# Patient Record
Sex: FEMALE | Race: WHITE | NOT HISPANIC OR LATINO | Employment: FULL TIME | URBAN - METROPOLITAN AREA
[De-identification: names, ages, dates, MRNs, and addresses within clinical notes are randomized per-mention and may not be internally consistent; named-entity substitution may affect disease eponyms.]

---

## 2017-02-06 ENCOUNTER — GYNECOLOGY VISIT (OUTPATIENT)
Dept: OBGYN | Facility: CLINIC | Age: 32
End: 2017-02-06
Payer: COMMERCIAL

## 2017-02-06 VITALS
HEIGHT: 63 IN | SYSTOLIC BLOOD PRESSURE: 102 MMHG | BODY MASS INDEX: 27.18 KG/M2 | DIASTOLIC BLOOD PRESSURE: 66 MMHG | WEIGHT: 153.4 LBS

## 2017-02-06 DIAGNOSIS — Z30.431 IUD CHECK UP: ICD-10-CM

## 2017-02-06 PROCEDURE — 99213 OFFICE O/P EST LOW 20 MIN: CPT | Performed by: OBSTETRICS & GYNECOLOGY

## 2017-02-06 RX ORDER — IBUPROFEN 800 MG/1
800 TABLET ORAL EVERY 8 HOURS PRN
Qty: 30 TAB | Refills: 4 | Status: SHIPPED | OUTPATIENT
Start: 2017-02-06

## 2017-02-06 NOTE — PROGRESS NOTES
"31-year-old  who is here today for follow-up of IUD placement. Patient had ParaGard placed . She states she is doing well and desires to retain the device. She states she's had 2 menstrual cycles are slightly heavier and slightly more painful than previous but otherwise she states she is doing well. She has no abdominal pain pelvic pain, no issues related to intercourse.    /66 mmHg  Ht 1.6 m (5' 3\")  Wt 69.582 kg (153 lb 6.4 oz)  BMI 27.18 kg/m2  LMP 2014  Breastfeeding? No  Abdomen is nontender and soft  Normal external female genitalia  Cervix is normal with the IUD strings clearly noted at the cervical os  The uterus is nontender  Adnexa bilaterally nontender    Status post IUD placement doing well  Motrin 800 mg every 8 hours during menstrual cycle for pain and menorrhagia  Follow-up as needed or in one year for annual gynecologic exam  "

## 2017-02-06 NOTE — MR AVS SNAPSHOT
"        Wendy Garcia   2017 9:00 AM   Gynecology Visit   MRN: [id number]    Department:  [facility]   Dept Phone:  [phone]    Description:  Female : 1985   Provider:  Diana Keith M.D.           Reason for Visit     Follow-Up           Allergies as of 2017     Allergen Noted Reactions    Amoxicillin 2014   Rash      You were diagnosed with     IUD check up   [224514]         Vital Signs     Blood Pressure Height Weight Body Mass Index Last Menstrual Period Breastfeeding?    102/66 mmHg 1.6 m (5' 3\") 69.582 kg (153 lb 6.4 oz) 27.18 kg/m2 2014 No    Smoking Status                   Never Smoker            Basic Information     Date Of Birth Sex Race Ethnicity Preferred Language    1985 Female White Non- English      Problem List              ICD-10-CM Priority Class Noted - Resolved    Rubella non-immune status, antepartum O99.89, Z28.3   2/10/2015 - Present    GBS (group B Streptococcus carrier), +RV culture, currently pregnant O99.820   2015 - Present      Health Maintenance        Date Due Completion Dates    IMM INFLUENZA (1) 2016 ---    PAP SMEAR 6/3/2019 6/3/2016, 2015, 2014    IMM DTaP/Tdap/Td Vaccine (2 - Td) 2025            Current Immunizations     MMR Vaccine 2015  8:26 PM    MMR/Varicella Combined Vaccine  Incomplete    Tdap Vaccine  Incomplete, 2015  3:16 PM      Below and/or attached are the medications your provider expects you to take. Review all of your home medications and newly ordered medications with your provider and/or pharmacist. Follow medication instructions as directed by your provider and/or pharmacist. Please keep your medication list with you and share with your provider. Update the information when medications are discontinued, doses are changed, or new medications (including over-the-counter products) are added; and carry medication information at all times in the event of emergency " situations     Allergies:  AMOXICILLIN - Rash               Medications  Valid as of: February 06, 2017 -  9:22 AM    Generic Name Brand Name Tablet Size Instructions for use    Ibuprofen (Tab) MOTRIN 600 MG Take 1 Tab by mouth every 6 hours as needed (Cramping).        Ibuprofen (Tab) MOTRIN 800 MG Take 1 Tab by mouth every 8 hours as needed.        Misc. Devices (Misc) Breast Pump  1 Each by Other route every day.        Norethin Ace-Eth Estrad-FE (Chew Tab) Norethin Ace-Eth Estrad-FE 1-20 MG-MCG(24) Take 1 Tab by mouth every day.        Norethin Ace-Eth Estrad-FE (Chew Tab) Norethin Ace-Eth Estrad-FE 1-20 MG-MCG(24) Take 1 mg by mouth every day.        Oxycodone-Acetaminophen (Tab) PERCOCET 5-325 MG Take 1 Tab by mouth every four hours as needed for Moderate Pain ((Pain Scale 4-6)).        Prenatal MV-Min-Fe Fum-FA-DHA   Take  by mouth.        .                 Medicines prescribed today were sent to:     [facility] [address] [place] [address] [place], [place] - [address] AT [facility] [address] & [place] [address]    [address] [place] [address]    Phone: [phone] Fax: [phone]    Open 24 Hours?: No      Medication refill instructions:       If your prescription bottle indicates you have medication refills left, it is not necessary to call your provider’s office. Please contact your pharmacy and they will refill your medication.    If your prescription bottle indicates you do not have any refills left, you may request refills at any time through one of the following ways: The online [employer] system (except Urgent Care), by calling your provider’s office, or by asking your pharmacy to contact your provider’s office with a refill request. Medication refills are processed only during regular business hours and may not be available until the next business day. Your provider may request additional information or to have a follow-up visit with you prior to refilling your medication.   *Please Note: Medication refills  are assigned a new Rx number when refilled electronically. Your pharmacy may indicate that no refills were authorized even though a new prescription for the same medication is available at the pharmacy. Please request the medicine by name with the pharmacy before contacting your provider for a refill.           [employer] Access Code: Activation code not generated  Current [employer] Status: Active

## 2017-02-10 ENCOUNTER — OFFICE VISIT (OUTPATIENT)
Dept: URGENT CARE | Facility: PHYSICIAN GROUP | Age: 32
End: 2017-02-10
Payer: COMMERCIAL

## 2017-02-10 VITALS
OXYGEN SATURATION: 97 % | WEIGHT: 150 LBS | DIASTOLIC BLOOD PRESSURE: 64 MMHG | HEART RATE: 60 BPM | SYSTOLIC BLOOD PRESSURE: 110 MMHG | BODY MASS INDEX: 26.58 KG/M2 | TEMPERATURE: 98 F

## 2017-02-10 DIAGNOSIS — H10.32 ACUTE CONJUNCTIVITIS OF LEFT EYE, UNSPECIFIED ACUTE CONJUNCTIVITIS TYPE: ICD-10-CM

## 2017-02-10 PROCEDURE — 99214 OFFICE O/P EST MOD 30 MIN: CPT | Performed by: PHYSICIAN ASSISTANT

## 2017-02-10 RX ORDER — CIPROFLOXACIN HYDROCHLORIDE 3.5 MG/ML
1 SOLUTION/ DROPS TOPICAL
Qty: 1 BOTTLE | Refills: 0 | Status: SHIPPED | OUTPATIENT
Start: 2017-02-10

## 2017-02-10 RX ORDER — COPPER 313.4 MG/1
INTRAUTERINE DEVICE INTRAUTERINE
COMMUNITY

## 2017-02-10 ASSESSMENT — ENCOUNTER SYMPTOMS
BLURRED VISION: 0
DOUBLE VISION: 0
VISUAL CHANGE: 0
EYE DISCHARGE: 1
EYE REDNESS: 1

## 2017-02-10 NOTE — Clinical Note
February 10, 2017         Patient: Wendy Garcia   YOB: 1985   Date of Visit: 2/10/2017           To Whom it May Concern:    Wendy Garcia was seen in my clinic on 2/10/2017. She may return to work on 02/13/2017.    If you have any questions or concerns, please don't hesitate to call.        Sincerely,           Cynthia Arnold PA-C  Electronically Signed

## 2017-02-10 NOTE — PATIENT INSTRUCTIONS
"Conjunctivitis  Conjunctivitis is commonly called \"pink eye.\" Conjunctivitis can be caused by bacterial or viral infection, allergies, or injuries. There is usually redness of the lining of the eye, itching, discomfort, and sometimes discharge. There may be deposits of matter along the eyelids. A viral infection usually causes a watery discharge, while a bacterial infection causes a yellowish, thick discharge. Pink eye is very contagious and spreads by direct contact.  You may be given antibiotic eyedrops as part of your treatment. Before using your eye medicine, remove all drainage from the eye by washing gently with warm water and cotton balls. Continue to use the medication until you have awakened 2 mornings in a row without discharge from the eye. Do not rub your eye. This increases the irritation and helps spread infection. Use separate towels from other household members. Wash your hands with soap and water before and after touching your eyes. Use cold compresses to reduce pain and sunglasses to relieve irritation from light. Do not wear contact lenses or wear eye makeup until the infection is gone.  SEEK MEDICAL CARE IF:   · Your symptoms are not better after 3 days of treatment.  · You have increased pain or trouble seeing.  · The outer eyelids become very red or swollen.  Document Released: 01/25/2006 Document Revised: 03/11/2013 Document Reviewed: 12/18/2006  [employer]® Patient Information ©2014 [employer].    "

## 2017-02-10 NOTE — MR AVS SNAPSHOT
Wendy Garcia   2/10/2017 8:00 AM   Office Visit   MRN: [id number]    Department:  [facility]   Dept Phone:  [phone]    Description:  Female : 1985   Provider:  Cynthia Arnold PA-C           Reason for Visit     Conjunctivitis and drainage, woke with puffiness and tearing this morning      Allergies as of 2/10/2017     Allergen Noted Reactions    Amoxicillin 2014   Rash      You were diagnosed with     Acute conjunctivitis of left eye, unspecified acute conjunctivitis type   [1894090]         Vital Signs     Blood Pressure Pulse Temperature Weight Oxygen Saturation Last Menstrual Period    110/64 mmHg 60 36.7 °C (98 °F) 68.04 kg (150 lb) 97% 2014    Smoking Status                   Never Smoker            Basic Information     Date Of Birth Sex Race Ethnicity Preferred Language    1985 Female White Non- English      Problem List              ICD-10-CM Priority Class Noted - Resolved    Rubella non-immune status, antepartum O99.89, Z28.3   2/10/2015 - Present    GBS (group B Streptococcus carrier), +RV culture, currently pregnant O99.820   2015 - Present      Health Maintenance        Date Due Completion Dates    IMM INFLUENZA (1) 2016 ---    PAP SMEAR 6/3/2019 6/3/2016, 2015, 2014    IMM DTaP/Tdap/Td Vaccine (2 - Td) 2025            Current Immunizations     MMR Vaccine 2015  8:26 PM    MMR/Varicella Combined Vaccine  Incomplete    Tdap Vaccine  Incomplete, 2015  3:16 PM      Below and/or attached are the medications your provider expects you to take. Review all of your home medications and newly ordered medications with your provider and/or pharmacist. Follow medication instructions as directed by your provider and/or pharmacist. Please keep your medication list with you and share with your provider. Update the information when medications are discontinued, doses are changed, or new medications (including  over-the-counter products) are added; and carry medication information at all times in the event of emergency situations     Allergies:  AMOXICILLIN - Rash               Medications  Valid as of: February 10, 2017 -  8:44 AM    Generic Name Brand Name Tablet Size Instructions for use    Ciprofloxacin HCl (Solution) CILOXIN 0.3 % Place 1 Drop in left eye every 4 hours while awake. Use for 5 days.        Copper (IUD) PARAGARD INTRAUTERINE COPPER  by Intrauterine route.        Ibuprofen (Tab) MOTRIN 600 MG Take 1 Tab by mouth every 6 hours as needed (Cramping).        Ibuprofen (Tab) MOTRIN 800 MG Take 1 Tab by mouth every 8 hours as needed.        Misc. Devices (Misc) Breast Pump  1 Each by Other route every day.        Norethin Ace-Eth Estrad-FE (Chew Tab) Norethin Ace-Eth Estrad-FE 1-20 MG-MCG(24) Take 1 Tab by mouth every day.        Norethin Ace-Eth Estrad-FE (Chew Tab) Norethin Ace-Eth Estrad-FE 1-20 MG-MCG(24) Take 1 mg by mouth every day.        Oxycodone-Acetaminophen (Tab) PERCOCET 5-325 MG Take 1 Tab by mouth every four hours as needed for Moderate Pain ((Pain Scale 4-6)).        Prenatal MV-Min-Fe Fum-FA-DHA   Take  by mouth.        .                 Medicines prescribed today were sent to:     [facility] [address] [place] [address] [address] [place] [address] AT [facility] OF [facility]. & [place] [address] [address]    Phone: [phone] Fax: [phone]    Open 24 Hours?: No      Medication refill instructions:       If your prescription bottle indicates you have medication refills left, it is not necessary to call your provider’s office. Please contact your pharmacy and they will refill your medication.    If your prescription bottle indicates you do not have any refills left, you may request refills at any time through one of the following ways: The online [employer] system (except Urgent Care), by calling your provider’s office, or by asking your pharmacy to contact your provider’s office  "with a refill request. Medication refills are processed only during regular business hours and may not be available until the next business day. Your provider may request additional information or to have a follow-up visit with you prior to refilling your medication.   *Please Note: Medication refills are assigned a new Rx number when refilled electronically. Your pharmacy may indicate that no refills were authorized even though a new prescription for the same medication is available at the pharmacy. Please request the medicine by name with the pharmacy before contacting your provider for a refill.        Instructions    Conjunctivitis  Conjunctivitis is commonly called \"pink eye.\" Conjunctivitis can be caused by bacterial or viral infection, allergies, or injuries. There is usually redness of the lining of the eye, itching, discomfort, and sometimes discharge. There may be deposits of matter along the eyelids. A viral infection usually causes a watery discharge, while a bacterial infection causes a yellowish, thick discharge. Pink eye is very contagious and spreads by direct contact.  You may be given antibiotic eyedrops as part of your treatment. Before using your eye medicine, remove all drainage from the eye by washing gently with warm water and cotton balls. Continue to use the medication until you have awakened 2 mornings in a row without discharge from the eye. Do not rub your eye. This increases the irritation and helps spread infection. Use separate towels from other household members. Wash your hands with soap and water before and after touching your eyes. Use cold compresses to reduce pain and sunglasses to relieve irritation from light. Do not wear contact lenses or wear eye makeup until the infection is gone.  SEEK MEDICAL CARE IF:   · Your symptoms are not better after 3 days of treatment.  · You have increased pain or trouble seeing.  · The outer eyelids become very red or swollen.  Document Released: " 01/25/2006 Document Revised: 03/11/2013 Document Reviewed: 12/18/2006  [employer]® Patient Information ©2014 [employer], [facility].            MyChart Access Code: Activation code not generated  Current MyChart Status: Active

## 2017-02-10 NOTE — PROGRESS NOTES
Subjective:      Wendy Garcia is a 31 y.o. female who presents with Conjunctivitis    Pt PMH, SocHx, SurgHx, FamHx, Drug allergies and medications reviewed with pt/EPIC.      Family history reviewed, it is not pertinent to this complaint.             HPI Comments: Patient presents with:  Conjunctivitis: and drainage, woke with puffiness and tearing this morning.  Pt is contact lens wearer.  Denies vision changes or recent URI. Went to a  function yesterday.     Conjunctivitis  This is a new problem. The current episode started today. The problem occurs constantly. The problem has been unchanged. Pertinent negatives include no visual change. Nothing aggravates the symptoms. She has tried nothing for the symptoms. The treatment provided no relief.       Review of Systems   Eyes: Positive for discharge and redness. Negative for blurred vision and double vision.   All other systems reviewed and are negative.         Objective:     /64 mmHg  Pulse 60  Temp(Src) 36.7 °C (98 °F)  Wt 68.04 kg (150 lb)  SpO2 97%  LMP 11/06/2014     Physical Exam   Constitutional: She is oriented to person, place, and time. She appears well-developed and well-nourished. No distress.   HENT:   Head: Normocephalic.   Eyes: EOM and lids are normal. Pupils are equal, round, and reactive to light. Left eye exhibits exudate. Left conjunctiva is injected.   Neck: Normal range of motion. Neck supple.   Cardiovascular: Normal rate, regular rhythm and normal heart sounds.    Pulmonary/Chest: Effort normal and breath sounds normal.   Musculoskeletal: Normal range of motion.   Lymphadenopathy:     She has no cervical adenopathy.   Neurological: She is alert and oriented to person, place, and time.   Skin: Skin is warm and dry.   Psychiatric: She has a normal mood and affect.   Nursing note and vitals reviewed.         Assessment/Plan:     1. Acute conjunctivitis of left eye, unspecified acute conjunctivitis type     - ciprofloxacin  (CILOXIN) 0.3 % Solution; Place 1 Drop in left eye every 4 hours while awake. Use for 5 days.  Dispense: 1 Bottle; Refill: 0    Pt instructed to discard her contact lenses, contact case, eye make-up that she used today.      PT can use cool compress on affected eye(s) for relief of symptoms.     Motrin/Advil/Ibuprophen 600 mg every 6 hours as needed for pain or fever.

## 2017-06-05 ENCOUNTER — TELEPHONE (OUTPATIENT)
Dept: OBGYN | Facility: CLINIC | Age: 32
End: 2017-06-05

## 2017-06-05 DIAGNOSIS — Z30.432 ENCOUNTER FOR IUD REMOVAL: ICD-10-CM

## 2017-06-05 NOTE — TELEPHONE ENCOUNTER
Patient called and is wanting her IUD removed because she is wanting to conceive a child. Needs a referral fir the IUD removal.

## 2017-07-11 ENCOUNTER — TELEPHONE (OUTPATIENT)
Dept: OBGYN | Facility: CLINIC | Age: 32
End: 2017-07-11

## 2017-07-11 NOTE — TELEPHONE ENCOUNTER
Patient called and has an appointment on 07/31/17 to have her IUD removed. She wants to know if the Dr and still remove if she is on her cycle. Please call patient

## 2017-07-31 ENCOUNTER — GYNECOLOGY VISIT (OUTPATIENT)
Dept: OBGYN | Facility: CLINIC | Age: 32
End: 2017-07-31
Payer: COMMERCIAL

## 2017-07-31 VITALS
HEIGHT: 63 IN | SYSTOLIC BLOOD PRESSURE: 102 MMHG | BODY MASS INDEX: 28.35 KG/M2 | DIASTOLIC BLOOD PRESSURE: 72 MMHG | WEIGHT: 160 LBS

## 2017-07-31 DIAGNOSIS — Z30.432 ENCOUNTER FOR REMOVAL OF INTRAUTERINE CONTRACEPTIVE DEVICE (IUD): ICD-10-CM

## 2017-07-31 PROCEDURE — 58301 REMOVE INTRAUTERINE DEVICE: CPT | Performed by: OBSTETRICS & GYNECOLOGY

## 2017-07-31 NOTE — PROGRESS NOTES
31-year-old  currently without complaints desires IUD removal so that she can conceive  Taking prenatal vitamins at this time    Risks of IUD removal were discussed with patient  Informed consent was signed  Speculum placed in vagina  IUD string was noted at cervical os IUD removed with ring forceps  IUD showed to patient and discarded    Follow-up as needed

## 2017-07-31 NOTE — MR AVS SNAPSHOT
"        Wendy Garcia   2017 11:15 AM   Gynecology Visit   MRN: [id number]    Department:  [facility]   Dept Phone:  [phone]    Description:  Female : 1985   Provider:  Diana Keith M.D.           Reason for Visit     Procedure           Allergies as of 2017     Allergen Noted Reactions    Amoxicillin 2014   Rash      You were diagnosed with     Encounter for removal of intrauterine contraceptive device (IUD)   [[id number]]         Vital Signs     Blood Pressure Height Weight Body Mass Index Last Menstrual Period Breastfeeding?    102/72 mmHg 1.6 m (5' 2.99\") 72.576 kg (160 lb) 28.35 kg/m2 2014 No    Smoking Status                   Never Smoker            Basic Information     Date Of Birth Sex Race Ethnicity Preferred Language    1985 Female White Non- English      Problem List              ICD-10-CM Priority Class Noted - Resolved    Rubella non-immune status, antepartum O99.89, Z28.3   2/10/2015 - Present    GBS (group B Streptococcus carrier), +RV culture, currently pregnant O99.820   2015 - Present      Health Maintenance        Date Due Completion Dates    IMM INFLUENZA (1) 2017 ---    PAP SMEAR 6/3/2019 6/3/2016, 2015, 2014    IMM DTaP/Tdap/Td Vaccine (2 - Td) 2025            Current Immunizations     MMR Vaccine 2015  8:26 PM    MMR/Varicella Combined Vaccine  Incomplete    Tdap Vaccine  Incomplete, 2015  3:16 PM      Below and/or attached are the medications your provider expects you to take. Review all of your home medications and newly ordered medications with your provider and/or pharmacist. Follow medication instructions as directed by your provider and/or pharmacist. Please keep your medication list with you and share with your provider. Update the information when medications are discontinued, doses are changed, or new medications (including over-the-counter products) are added; and carry medication " information at all times in the event of emergency situations     Allergies:  AMOXICILLIN - Rash               Medications  Valid as of: July 31, 2017 - 11:50 AM    Generic Name Brand Name Tablet Size Instructions for use    Ciprofloxacin HCl (Solution) CILOXIN 0.3 % Place 1 Drop in left eye every 4 hours while awake. Use for 5 days.        Copper (IUD) PARAGARD INTRAUTERINE COPPER  by Intrauterine route.        Ibuprofen (Tab) MOTRIN 600 MG Take 1 Tab by mouth every 6 hours as needed (Cramping).        Ibuprofen (Tab) MOTRIN 800 MG Take 1 Tab by mouth every 8 hours as needed.        Misc. Devices (Misc) Breast Pump  1 Each by Other route every day.        Norethin Ace-Eth Estrad-FE (Chew Tab) Norethin Ace-Eth Estrad-FE 1-20 MG-MCG(24) Take 1 Tab by mouth every day.        Norethin Ace-Eth Estrad-FE (Chew Tab) Norethin Ace-Eth Estrad-FE 1-20 MG-MCG(24) Take 1 mg by mouth every day.        Oxycodone-Acetaminophen (Tab) PERCOCET 5-325 MG Take 1 Tab by mouth every four hours as needed for Moderate Pain ((Pain Scale 4-6)).        Prenatal MV-Min-Fe Fum-FA-DHA   Take  by mouth.        .                 Medicines prescribed today were sent to:     [facility] [address] [place] [address] AT [facility]. & [place] [facility]    [address] [place] [address]    Phone: [phone] Fax: [phone]    Open 24 Hours?: No      Medication refill instructions:       If your prescription bottle indicates you have medication refills left, it is not necessary to call your provider’s office. Please contact your pharmacy and they will refill your medication.    If your prescription bottle indicates you do not have any refills left, you may request refills at any time through one of the following ways: The online [employer] system (except Urgent Care), by calling your provider’s office, or by asking your pharmacy to contact your provider’s office with a refill request. Medication refills are processed only  during regular business hours and may not be available until the next business day. Your provider may request additional information or to have a follow-up visit with you prior to refilling your medication.   *Please Note: Medication refills are assigned a new Rx number when refilled electronically. Your pharmacy may indicate that no refills were authorized even though a new prescription for the same medication is available at the pharmacy. Please request the medicine by name with the pharmacy before contacting your provider for a refill.           [employer] Access Code: Activation code not generated  Current [employer] Status: Active

## 2017-10-23 ENCOUNTER — HOSPITAL ENCOUNTER (OUTPATIENT)
Dept: LAB | Facility: MEDICAL CENTER | Age: 32
End: 2017-10-23
Attending: PEDIATRICS
Payer: COMMERCIAL

## 2017-10-23 LAB
HBV SURFACE AG SER QL: NEGATIVE
HCV AB SER QL: NEGATIVE
HIV 1+2 AB+HIV1 P24 AG SERPL QL IA: NON REACTIVE

## 2017-10-23 PROCEDURE — 87389 HIV-1 AG W/HIV-1&-2 AB AG IA: CPT

## 2017-10-23 PROCEDURE — 36415 COLL VENOUS BLD VENIPUNCTURE: CPT

## 2017-10-23 PROCEDURE — 87340 HEPATITIS B SURFACE AG IA: CPT

## 2017-10-23 PROCEDURE — 86803 HEPATITIS C AB TEST: CPT
